# Patient Record
Sex: FEMALE | Race: WHITE | NOT HISPANIC OR LATINO | Employment: OTHER | URBAN - METROPOLITAN AREA
[De-identification: names, ages, dates, MRNs, and addresses within clinical notes are randomized per-mention and may not be internally consistent; named-entity substitution may affect disease eponyms.]

---

## 2024-10-01 ENCOUNTER — TELEPHONE (OUTPATIENT)
Dept: GASTROENTEROLOGY | Facility: CLINIC | Age: 64
End: 2024-10-01
Payer: MEDICARE

## 2024-10-01 NOTE — TELEPHONE ENCOUNTER
----- Message from Naomi sent at 9/30/2024 11:26 AM CDT -----  Regarding: Appointment  Contact: patient  Per phone call with patient, she stated that she would like to schedule an appointment to see the physician regarding a positive colon guard and she may need to have a colonoscopy to be done.  Please return call at [phone] ([phone]).    Thanks,  SJ